# Patient Record
Sex: MALE | Race: WHITE | Employment: FULL TIME | URBAN - METROPOLITAN AREA
[De-identification: names, ages, dates, MRNs, and addresses within clinical notes are randomized per-mention and may not be internally consistent; named-entity substitution may affect disease eponyms.]

---

## 2022-01-15 ENCOUNTER — HOSPITAL ENCOUNTER (INPATIENT)
Age: 57
LOS: 2 days | Discharge: HOME | DRG: 240 | End: 2022-01-17
Attending: EMERGENCY MEDICINE | Admitting: HOSPITALIST
Payer: MEDICAID

## 2022-01-15 ENCOUNTER — APPOINTMENT (OUTPATIENT)
Dept: CT IMAGING | Age: 57
DRG: 240 | End: 2022-01-15
Attending: EMERGENCY MEDICINE
Payer: MEDICAID

## 2022-01-15 ENCOUNTER — APPOINTMENT (OUTPATIENT)
Dept: GENERAL RADIOLOGY | Age: 57
DRG: 240 | End: 2022-01-15
Attending: EMERGENCY MEDICINE
Payer: MEDICAID

## 2022-01-15 DIAGNOSIS — R91.1 PULMONARY NODULE: Primary | ICD-10-CM

## 2022-01-15 DIAGNOSIS — K22.89 ESOPHAGEAL MASS: ICD-10-CM

## 2022-01-15 LAB
ALBUMIN SERPL-MCNC: 2.6 G/DL (ref 3.5–5)
ALBUMIN/GLOB SERPL: 0.6 {RATIO} (ref 1.1–2.2)
ALP SERPL-CCNC: 94 U/L (ref 45–117)
ALT SERPL-CCNC: 12 U/L (ref 12–78)
ANION GAP SERPL CALC-SCNC: 12 MMOL/L (ref 5–15)
APAP SERPL-MCNC: <2 UG/ML (ref 10–30)
APPEARANCE UR: CLEAR
AST SERPL-CCNC: 19 U/L (ref 15–37)
BACTERIA URNS QL MICRO: NEGATIVE /HPF
BASOPHILS # BLD: 0.1 K/UL (ref 0–0.1)
BASOPHILS NFR BLD: 1 % (ref 0–1)
BILIRUB SERPL-MCNC: 0.4 MG/DL (ref 0.2–1)
BILIRUB UR QL: NEGATIVE
BUN SERPL-MCNC: 12 MG/DL (ref 6–20)
BUN/CREAT SERPL: 14 (ref 12–20)
CALCIUM SERPL-MCNC: 8.8 MG/DL (ref 8.5–10.1)
CHLORIDE SERPL-SCNC: 101 MMOL/L (ref 97–108)
CO2 SERPL-SCNC: 23 MMOL/L (ref 21–32)
COLOR UR: NORMAL
COMMENT, HOLDF: NORMAL
COVID-19 RAPID TEST, COVR: NOT DETECTED
CREAT SERPL-MCNC: 0.88 MG/DL (ref 0.7–1.3)
DIFFERENTIAL METHOD BLD: ABNORMAL
EOSINOPHIL # BLD: 0.1 K/UL (ref 0–0.4)
EOSINOPHIL NFR BLD: 1 % (ref 0–7)
EPITH CASTS URNS QL MICRO: NORMAL /LPF
ERYTHROCYTE [DISTWIDTH] IN BLOOD BY AUTOMATED COUNT: 13.5 % (ref 11.5–14.5)
ETHANOL SERPL-MCNC: <10 MG/DL
GLOBULIN SER CALC-MCNC: 4.3 G/DL (ref 2–4)
GLUCOSE SERPL-MCNC: 108 MG/DL (ref 65–100)
GLUCOSE UR STRIP.AUTO-MCNC: NEGATIVE MG/DL
HCT VFR BLD AUTO: 44 % (ref 36.6–50.3)
HGB BLD-MCNC: 14.7 G/DL (ref 12.1–17)
HGB UR QL STRIP: NEGATIVE
IMM GRANULOCYTES # BLD AUTO: 0 K/UL (ref 0–0.04)
IMM GRANULOCYTES NFR BLD AUTO: 0 % (ref 0–0.5)
INR PPP: 1.1 (ref 0.9–1.1)
KETONES UR QL STRIP.AUTO: NEGATIVE MG/DL
LEUKOCYTE ESTERASE UR QL STRIP.AUTO: NEGATIVE
LIPASE SERPL-CCNC: 63 U/L (ref 73–393)
LYMPHOCYTES # BLD: 1 K/UL (ref 0.8–3.5)
LYMPHOCYTES NFR BLD: 10 % (ref 12–49)
MAGNESIUM SERPL-MCNC: 1.8 MG/DL (ref 1.6–2.4)
MCH RBC QN AUTO: 29.2 PG (ref 26–34)
MCHC RBC AUTO-ENTMCNC: 33.4 G/DL (ref 30–36.5)
MCV RBC AUTO: 87.5 FL (ref 80–99)
MONOCYTES # BLD: 0.9 K/UL (ref 0–1)
MONOCYTES NFR BLD: 9 % (ref 5–13)
NEUTS SEG # BLD: 7.9 K/UL (ref 1.8–8)
NEUTS SEG NFR BLD: 79 % (ref 32–75)
NITRITE UR QL STRIP.AUTO: NEGATIVE
NRBC # BLD: 0 K/UL (ref 0–0.01)
NRBC BLD-RTO: 0 PER 100 WBC
PH UR STRIP: 6.5 [PH] (ref 5–8)
PLATELET # BLD AUTO: 235 K/UL (ref 150–400)
PMV BLD AUTO: 10.3 FL (ref 8.9–12.9)
POTASSIUM SERPL-SCNC: 3.9 MMOL/L (ref 3.5–5.1)
PROT SERPL-MCNC: 6.9 G/DL (ref 6.4–8.2)
PROT UR STRIP-MCNC: NEGATIVE MG/DL
PROTHROMBIN TIME: 10.6 SEC (ref 9–11.1)
RBC # BLD AUTO: 5.03 M/UL (ref 4.1–5.7)
RBC #/AREA URNS HPF: NORMAL /HPF (ref 0–5)
SAMPLES BEING HELD,HOLD: NORMAL
SODIUM SERPL-SCNC: 136 MMOL/L (ref 136–145)
SOURCE, COVRS: NORMAL
SP GR UR REFRACTOMETRY: 1.01 (ref 1–1.03)
UROBILINOGEN UR QL STRIP.AUTO: 0.2 EU/DL (ref 0.2–1)
WBC # BLD AUTO: 9.9 K/UL (ref 4.1–11.1)
WBC URNS QL MICRO: NORMAL /HPF (ref 0–4)

## 2022-01-15 PROCEDURE — 83690 ASSAY OF LIPASE: CPT

## 2022-01-15 PROCEDURE — 99284 EMERGENCY DEPT VISIT MOD MDM: CPT

## 2022-01-15 PROCEDURE — 85025 COMPLETE CBC W/AUTO DIFF WBC: CPT

## 2022-01-15 PROCEDURE — 36415 COLL VENOUS BLD VENIPUNCTURE: CPT

## 2022-01-15 PROCEDURE — 74011000636 HC RX REV CODE- 636: Performed by: EMERGENCY MEDICINE

## 2022-01-15 PROCEDURE — 85610 PROTHROMBIN TIME: CPT

## 2022-01-15 PROCEDURE — 74011000250 HC RX REV CODE- 250: Performed by: INTERNAL MEDICINE

## 2022-01-15 PROCEDURE — 74011250636 HC RX REV CODE- 250/636: Performed by: INTERNAL MEDICINE

## 2022-01-15 PROCEDURE — 83735 ASSAY OF MAGNESIUM: CPT

## 2022-01-15 PROCEDURE — 71045 X-RAY EXAM CHEST 1 VIEW: CPT

## 2022-01-15 PROCEDURE — 81001 URINALYSIS AUTO W/SCOPE: CPT

## 2022-01-15 PROCEDURE — 65270000029 HC RM PRIVATE

## 2022-01-15 PROCEDURE — 82077 ASSAY SPEC XCP UR&BREATH IA: CPT

## 2022-01-15 PROCEDURE — 80053 COMPREHEN METABOLIC PANEL: CPT

## 2022-01-15 PROCEDURE — 87635 SARS-COV-2 COVID-19 AMP PRB: CPT

## 2022-01-15 PROCEDURE — 74177 CT ABD & PELVIS W/CONTRAST: CPT

## 2022-01-15 PROCEDURE — 80143 DRUG ASSAY ACETAMINOPHEN: CPT

## 2022-01-15 RX ORDER — ASCORBIC ACID 500 MG
500 TABLET ORAL DAILY
COMMUNITY

## 2022-01-15 RX ORDER — ONDANSETRON 4 MG/1
4 TABLET, ORALLY DISINTEGRATING ORAL
Status: DISCONTINUED | OUTPATIENT
Start: 2022-01-15 | End: 2022-01-17 | Stop reason: HOSPADM

## 2022-01-15 RX ORDER — SODIUM CHLORIDE 0.9 % (FLUSH) 0.9 %
10 SYRINGE (ML) INJECTION DAILY
Status: DISCONTINUED | OUTPATIENT
Start: 2022-01-16 | End: 2022-01-17 | Stop reason: HOSPADM

## 2022-01-15 RX ORDER — PANTOPRAZOLE SODIUM 40 MG/10ML
40 INJECTION, POWDER, LYOPHILIZED, FOR SOLUTION INTRAVENOUS DAILY
Status: DISCONTINUED | OUTPATIENT
Start: 2022-01-16 | End: 2022-01-17 | Stop reason: HOSPADM

## 2022-01-15 RX ORDER — ACETAMINOPHEN 325 MG/1
650 TABLET ORAL
Status: DISCONTINUED | OUTPATIENT
Start: 2022-01-15 | End: 2022-01-17 | Stop reason: HOSPADM

## 2022-01-15 RX ORDER — SODIUM CHLORIDE 0.9 % (FLUSH) 0.9 %
5-40 SYRINGE (ML) INJECTION EVERY 8 HOURS
Status: DISCONTINUED | OUTPATIENT
Start: 2022-01-15 | End: 2022-01-17 | Stop reason: HOSPADM

## 2022-01-15 RX ORDER — ONDANSETRON 2 MG/ML
4 INJECTION INTRAMUSCULAR; INTRAVENOUS
Status: DISCONTINUED | OUTPATIENT
Start: 2022-01-15 | End: 2022-01-17 | Stop reason: HOSPADM

## 2022-01-15 RX ORDER — ACETAMINOPHEN 650 MG/1
650 SUPPOSITORY RECTAL
Status: DISCONTINUED | OUTPATIENT
Start: 2022-01-15 | End: 2022-01-17 | Stop reason: HOSPADM

## 2022-01-15 RX ORDER — POLYETHYLENE GLYCOL 3350 17 G/17G
17 POWDER, FOR SOLUTION ORAL DAILY PRN
Status: DISCONTINUED | OUTPATIENT
Start: 2022-01-15 | End: 2022-01-17 | Stop reason: HOSPADM

## 2022-01-15 RX ORDER — MORPHINE SULFATE 2 MG/ML
2 INJECTION, SOLUTION INTRAMUSCULAR; INTRAVENOUS
Status: DISCONTINUED | OUTPATIENT
Start: 2022-01-15 | End: 2022-01-17 | Stop reason: HOSPADM

## 2022-01-15 RX ORDER — SODIUM CHLORIDE 0.9 % (FLUSH) 0.9 %
5-40 SYRINGE (ML) INJECTION AS NEEDED
Status: DISCONTINUED | OUTPATIENT
Start: 2022-01-15 | End: 2022-01-17 | Stop reason: HOSPADM

## 2022-01-15 RX ORDER — HEPARIN SODIUM 5000 [USP'U]/ML
5000 INJECTION, SOLUTION INTRAVENOUS; SUBCUTANEOUS EVERY 8 HOURS
Status: DISPENSED | OUTPATIENT
Start: 2022-01-15 | End: 2022-01-17

## 2022-01-15 RX ORDER — SODIUM CHLORIDE, SODIUM LACTATE, POTASSIUM CHLORIDE, CALCIUM CHLORIDE 600; 310; 30; 20 MG/100ML; MG/100ML; MG/100ML; MG/100ML
75 INJECTION, SOLUTION INTRAVENOUS CONTINUOUS
Status: DISCONTINUED | OUTPATIENT
Start: 2022-01-15 | End: 2022-01-17 | Stop reason: HOSPADM

## 2022-01-15 RX ADMIN — HEPARIN SODIUM 5000 UNITS: 5000 INJECTION, SOLUTION INTRAVENOUS; SUBCUTANEOUS at 21:37

## 2022-01-15 RX ADMIN — IOPAMIDOL 100 ML: 755 INJECTION, SOLUTION INTRAVENOUS at 13:47

## 2022-01-15 RX ADMIN — SODIUM CHLORIDE, PRESERVATIVE FREE 10 ML: 5 INJECTION INTRAVENOUS at 21:36

## 2022-01-15 RX ADMIN — SODIUM CHLORIDE, POTASSIUM CHLORIDE, SODIUM LACTATE AND CALCIUM CHLORIDE 100 ML/HR: 600; 310; 30; 20 INJECTION, SOLUTION INTRAVENOUS at 21:36

## 2022-01-15 NOTE — ED TRIAGE NOTES
Triage: pt c/o upper abd pain x2 weeks. +loss of weight ~10-15 lbs over the last ~2 months. Denies fall, injury, or N/V/D. +constipation.

## 2022-01-15 NOTE — ED PROVIDER NOTES
Patient was brought here by his sister because of a several week history of unintentional 10 to 15 pound weight loss, fatigue, early satiety, pain in the epigastric right upper quadrant and right flank area and indigestion. He denies any medical problems. No abdominal history. Denies alcohol tobacco illegal drugs. Denies acetaminophen use. Does admit to taking over-the-counter vitamins which is not new for him. He does report increased fatigue. Denies fever or chills. No history of cancer. Denies dark-colored stools. Denies dysuria urgency frequency. Sister at bedside states that his skin looks more pale to yellow. Patient denies noticing any yellow. Denies history of hepatitis or hepatobiliary process. Denies any pain at this time. He states that he would not of come to the ED today were not for sister. But, he does admit that he knows that he needs to be seen by doctor. Denies feeling itchy. History reviewed. No pertinent past medical history. History reviewed. No pertinent surgical history. History reviewed. No pertinent family history.     Social History     Socioeconomic History    Marital status: SINGLE     Spouse name: Not on file    Number of children: Not on file    Years of education: Not on file    Highest education level: Not on file   Occupational History    Not on file   Tobacco Use    Smoking status: Former Smoker    Smokeless tobacco: Never Used   Substance and Sexual Activity    Alcohol use: Not Currently     Comment: Rarely    Drug use: Never    Sexual activity: Not on file   Other Topics Concern    Not on file   Social History Narrative    Not on file     Social Determinants of Health     Financial Resource Strain:     Difficulty of Paying Living Expenses: Not on file   Food Insecurity:     Worried About Running Out of Food in the Last Year: Not on file    Siddharth of Food in the Last Year: Not on file   Transportation Needs:     Lack of Transportation (Medical): Not on file    Lack of Transportation (Non-Medical): Not on file   Physical Activity:     Days of Exercise per Week: Not on file    Minutes of Exercise per Session: Not on file   Stress:     Feeling of Stress : Not on file   Social Connections:     Frequency of Communication with Friends and Family: Not on file    Frequency of Social Gatherings with Friends and Family: Not on file    Attends [employer] Services: Not on file    Active Member of [address] [place] [address] or Organizations: Not on file    Attends Club or Organization Meetings: Not on file    Marital Status: Not on file   Intimate Partner Violence:     Fear of Current or Ex-Partner: Not on file    Emotionally Abused: Not on file    Physically Abused: Not on file    Sexually Abused: Not on file   Housing Stability:     Unable to Pay for Housing in the Last Year: Not on file    Number of [place] in the Last Year: Not on file    Unstable Housing in the Last Year: Not on file         ALLERGIES: Patient has no known allergies. Review of Systems   Constitutional: Positive for fatigue. Negative for chills and fever. HENT: Negative for rhinorrhea and sore throat. Eyes: Negative for discharge and itching. Respiratory: Negative for cough, shortness of breath and wheezing. Cardiovascular: Negative for chest pain, palpitations and leg swelling. Gastrointestinal: Positive for abdominal pain. Negative for blood in stool, constipation, diarrhea, nausea and vomiting. Genitourinary: Positive for flank pain. Negative for dysuria and hematuria. Musculoskeletal: Negative for arthralgias, back pain, gait problem, joint swelling, myalgias, neck pain and neck stiffness. Skin: Positive for color change. Negative for pallor, rash and wound. Neurological: Negative for dizziness, syncope, light-headedness, numbness and headaches.        Vitals:    01/15/22 1330 01/15/22 1403 01/15/22 1433 01/15/22 1503   BP: 125/86 139/87 (!) 144/83 (!) 150/93   Pulse:       Resp:       Temp:       SpO2: 94% 98% 98% 99%   Weight:                Physical Exam  Vitals and nursing note reviewed. Exam conducted with a chaperone present. Constitutional:       General: He is not in acute distress. Appearance: He is well-developed and normal weight. He is not ill-appearing or toxic-appearing. HENT:      Head: Normocephalic and atraumatic. Mouth/Throat:      Mouth: Mucous membranes are moist.      Pharynx: Oropharynx is clear. No pharyngeal swelling or oropharyngeal exudate. Eyes:      General: No scleral icterus. Extraocular Movements: Extraocular movements intact. Pupils: Pupils are equal, round, and reactive to light. Cardiovascular:      Rate and Rhythm: Normal rate and regular rhythm. Heart sounds: Normal heart sounds. Pulmonary:      Effort: Pulmonary effort is normal.      Breath sounds: Normal breath sounds. Abdominal:      General: Abdomen is flat. Bowel sounds are normal. There is no distension. There are no signs of injury. Palpations: Abdomen is rigid. Tenderness: There is no abdominal tenderness. There is no right CVA tenderness, left CVA tenderness, guarding or rebound. Negative signs include Gutierrez's sign, Rovsing's sign and McBurney's sign. Skin:     General: Skin is warm and dry. Capillary Refill: Capillary refill takes less than 2 seconds. Coloration: Skin is not cyanotic, jaundiced, mottled or pale. Findings: No erythema or rash. Neurological:      General: No focal deficit present. Mental Status: He is alert and oriented to person, place, and time. Cranial Nerves: No cranial nerve deficit. Motor: No weakness. Comments: No asterixis   Psychiatric:         Mood and Affect: Mood normal.         Behavior: Behavior normal.          [facility]  ED Course as of 01/15/22 1532   Sat Thomas 15, 2022   1234 Patient examined.   Mild tachycardia otherwise vital signs normal.  Patient in no acute distress. No abdominal tenderness or abdominal physical exam findings at this time. Due to the history concern for possible hepatobiliary process or pancreas involvement. We will get CT scan of the abdomen pelvis. We will get COVID test.  We will get labs and urinalysis. [AF]   1324 COVID-19 RAPID TEST:    Specimen source Nasopharyngeal   COVID-19 rapid test Not detected [AF]   1324 LIPASE(!):    Lipase 63(!) [AF]   1324 MAGNESIUM:    Magnesium 1.8 [AF]   1324 CBC WITH AUTOMATED DIFF(!):    WBC 9.9   RBC 5.03   HGB 14.7   HCT 44.0   MCV 87.5   MCH 29.2   MCHC 33.4   RDW 13.5   PLATELET 827   MPV 50.5   NRBC 0.0   ABSOLUTE NRBC 0.00   NEUTROPHILS 79(!)   LYMPHOCYTES 10(!)   MONOCYTES 9   EOSINOPHILS 1   BASOPHILS 1   IMMATURE GRANULOCYTES 0   ABS. NEUTROPHILS 7.9   ABS. LYMPHOCYTES 1.0   ABS. MONOCYTES 0.9   ABS. EOSINOPHILS 0.1   ABS. BASOPHILS 0.1   ABS. IMM. GRANS. 0.0   DF AUTOMATED [AF]   1324 COMPREHENSIVE METABOLIC PANEL(!):    Sodium 136   Potassium 3.9   Chloride 101   CO2 23   Anion gap 12   Glucose 108(!)   BUN 12   Creatinine 0.88   BUN/Creatinine ratio 14   GFR est AA >60   GFR est non-AA >60   Calcium 8.8   Bilirubin, total 0.4   ALT 12   AST 19   Alk.  phosphatase 94   Protein, total 6.9   Albumin 2.6(!)   Globulin 4.3(!)   A-G Ratio 0.6(!) [AF]   1337 ACETAMINOPHEN(!):    Acetaminophen level <2(!) [AF]   1337 ETHYL ALCOHOL:    ALCOHOL(ETHYL),SERUM <10 [AF]   1400 URINALYSIS W/MICROSCOPIC:    Color YELLOW/STRAW   Appearance CLEAR   Specific gravity 1.010   pH (UA) 6.5   Protein Negative   Glucose Negative   Ketone Negative   Bilirubin Negative   Blood Negative   Urobilinogen 0.2   Nitrites Negative   Leukocyte Esterase Negative   WBC PENDING   RBC PENDING   Epithelial cells PENDING   Bacteria PENDING [AF]   1440 URINALYSIS W/MICROSCOPIC:    Color YELLOW/STRAW   Appearance CLEAR   Specific gravity 1.010   pH (UA) 6.5   Protein Negative   Glucose Negative   Ketone Negative   Bilirubin Negative Blood Negative   Urobilinogen 0.2   Nitrites Negative   Leukocyte Esterase Negative   WBC 0-4   RBC 0-5   Epithelial cells FEW   Bacteria Negative [AF]   1441 CT ABD PELV W CONT [AF]   1441 XR CHEST PORT [AF]   0805 Dr Darci Duncan accepting at [facility] [AF]      ED Course User Index  [AF] DO Natasha Loredo Serve Consult for Admission  2:47 PM    ED Room Number: SER08/08  Patient Name and age:  Ashly Portillo 55 y.o.  male  Working Diagnosis:   1. Pulmonary nodule    2. Esophageal mass        COVID-19 Suspicion:  no  Sepsis present:  no  Reassessment needed: no  Code Status:  Full Code  Readmission: no  Isolation Requirements:  no  Recommended Level of Care:  med/surg  Department:[place] ED - [phone]  Other:  Decreased appetitie, weight loss, ct scan concerning for cancer in esophageal area, cxr with pulm nodule.       Procedures

## 2022-01-16 ENCOUNTER — APPOINTMENT (OUTPATIENT)
Dept: CT IMAGING | Age: 57
DRG: 240 | End: 2022-01-16
Attending: INTERNAL MEDICINE
Payer: MEDICAID

## 2022-01-16 LAB
ALBUMIN SERPL-MCNC: 2.5 G/DL (ref 3.5–5)
ALBUMIN/GLOB SERPL: 0.8 {RATIO} (ref 1.1–2.2)
ALP SERPL-CCNC: 85 U/L (ref 45–117)
ALT SERPL-CCNC: 12 U/L (ref 12–78)
ANION GAP SERPL CALC-SCNC: 5 MMOL/L (ref 5–15)
AST SERPL-CCNC: 14 U/L (ref 15–37)
BASOPHILS # BLD: 0.1 K/UL (ref 0–0.1)
BASOPHILS NFR BLD: 1 % (ref 0–1)
BILIRUB SERPL-MCNC: 0.4 MG/DL (ref 0.2–1)
BUN SERPL-MCNC: 8 MG/DL (ref 6–20)
BUN/CREAT SERPL: 13 (ref 12–20)
CALCIUM SERPL-MCNC: 8.5 MG/DL (ref 8.5–10.1)
CHLORIDE SERPL-SCNC: 107 MMOL/L (ref 97–108)
CO2 SERPL-SCNC: 25 MMOL/L (ref 21–32)
CREAT SERPL-MCNC: 0.62 MG/DL (ref 0.7–1.3)
DIFFERENTIAL METHOD BLD: ABNORMAL
EOSINOPHIL # BLD: 0.2 K/UL (ref 0–0.4)
EOSINOPHIL NFR BLD: 2 % (ref 0–7)
ERYTHROCYTE [DISTWIDTH] IN BLOOD BY AUTOMATED COUNT: 12.9 % (ref 11.5–14.5)
GLOBULIN SER CALC-MCNC: 3.3 G/DL (ref 2–4)
GLUCOSE SERPL-MCNC: 91 MG/DL (ref 65–100)
HCT VFR BLD AUTO: 38.9 % (ref 36.6–50.3)
HGB BLD-MCNC: 12.7 G/DL (ref 12.1–17)
IMM GRANULOCYTES # BLD AUTO: 0 K/UL (ref 0–0.04)
IMM GRANULOCYTES NFR BLD AUTO: 0 % (ref 0–0.5)
LYMPHOCYTES # BLD: 0.9 K/UL (ref 0.8–3.5)
LYMPHOCYTES NFR BLD: 11 % (ref 12–49)
MAGNESIUM SERPL-MCNC: 2 MG/DL (ref 1.6–2.4)
MCH RBC QN AUTO: 28.5 PG (ref 26–34)
MCHC RBC AUTO-ENTMCNC: 32.6 G/DL (ref 30–36.5)
MCV RBC AUTO: 87.4 FL (ref 80–99)
MONOCYTES # BLD: 0.9 K/UL (ref 0–1)
MONOCYTES NFR BLD: 10 % (ref 5–13)
NEUTS SEG # BLD: 6.4 K/UL (ref 1.8–8)
NEUTS SEG NFR BLD: 76 % (ref 32–75)
NRBC # BLD: 0 K/UL (ref 0–0.01)
NRBC BLD-RTO: 0 PER 100 WBC
PHOSPHATE SERPL-MCNC: 4 MG/DL (ref 2.6–4.7)
PLATELET # BLD AUTO: 199 K/UL (ref 150–400)
PMV BLD AUTO: 9.7 FL (ref 8.9–12.9)
POTASSIUM SERPL-SCNC: 3.8 MMOL/L (ref 3.5–5.1)
PROT SERPL-MCNC: 5.8 G/DL (ref 6.4–8.2)
RBC # BLD AUTO: 4.45 M/UL (ref 4.1–5.7)
SODIUM SERPL-SCNC: 137 MMOL/L (ref 136–145)
TSH SERPL DL<=0.05 MIU/L-ACNC: 2.42 UIU/ML (ref 0.36–3.74)
WBC # BLD AUTO: 8.4 K/UL (ref 4.1–11.1)

## 2022-01-16 PROCEDURE — C9113 INJ PANTOPRAZOLE SODIUM, VIA: HCPCS | Performed by: INTERNAL MEDICINE

## 2022-01-16 PROCEDURE — 74011250636 HC RX REV CODE- 250/636: Performed by: INTERNAL MEDICINE

## 2022-01-16 PROCEDURE — 80053 COMPREHEN METABOLIC PANEL: CPT

## 2022-01-16 PROCEDURE — 84100 ASSAY OF PHOSPHORUS: CPT

## 2022-01-16 PROCEDURE — 99222 1ST HOSP IP/OBS MODERATE 55: CPT | Performed by: INTERNAL MEDICINE

## 2022-01-16 PROCEDURE — 84443 ASSAY THYROID STIM HORMONE: CPT

## 2022-01-16 PROCEDURE — 85025 COMPLETE CBC W/AUTO DIFF WBC: CPT

## 2022-01-16 PROCEDURE — 74011250637 HC RX REV CODE- 250/637: Performed by: NURSE PRACTITIONER

## 2022-01-16 PROCEDURE — 83735 ASSAY OF MAGNESIUM: CPT

## 2022-01-16 PROCEDURE — 74011000636 HC RX REV CODE- 636: Performed by: RADIOLOGY

## 2022-01-16 PROCEDURE — 36415 COLL VENOUS BLD VENIPUNCTURE: CPT

## 2022-01-16 PROCEDURE — 93005 ELECTROCARDIOGRAM TRACING: CPT

## 2022-01-16 PROCEDURE — 71260 CT THORAX DX C+: CPT

## 2022-01-16 PROCEDURE — 74011000250 HC RX REV CODE- 250: Performed by: INTERNAL MEDICINE

## 2022-01-16 PROCEDURE — 94760 N-INVAS EAR/PLS OXIMETRY 1: CPT

## 2022-01-16 PROCEDURE — 65270000029 HC RM PRIVATE

## 2022-01-16 RX ORDER — CALCIUM CARBONATE 200(500)MG
200 TABLET,CHEWABLE ORAL
Status: DISCONTINUED | OUTPATIENT
Start: 2022-01-16 | End: 2022-01-17 | Stop reason: HOSPADM

## 2022-01-16 RX ADMIN — HEPARIN SODIUM 5000 UNITS: 5000 INJECTION, SOLUTION INTRAVENOUS; SUBCUTANEOUS at 05:40

## 2022-01-16 RX ADMIN — HEPARIN SODIUM 5000 UNITS: 5000 INJECTION, SOLUTION INTRAVENOUS; SUBCUTANEOUS at 20:56

## 2022-01-16 RX ADMIN — PANTOPRAZOLE SODIUM 40 MG: 40 INJECTION, POWDER, FOR SOLUTION INTRAVENOUS at 07:52

## 2022-01-16 RX ADMIN — MORPHINE SULFATE 2 MG: 2 INJECTION, SOLUTION INTRAMUSCULAR; INTRAVENOUS at 00:09

## 2022-01-16 RX ADMIN — CALCIUM CARBONATE (ANTACID) CHEW TAB 500 MG 200 MG: 500 CHEW TAB at 21:53

## 2022-01-16 RX ADMIN — IOPAMIDOL 100 ML: 612 INJECTION, SOLUTION INTRAVENOUS at 17:20

## 2022-01-16 RX ADMIN — SODIUM CHLORIDE, PRESERVATIVE FREE 10 ML: 5 INJECTION INTRAVENOUS at 20:55

## 2022-01-16 RX ADMIN — SODIUM CHLORIDE, PRESERVATIVE FREE 10 ML: 5 INJECTION INTRAVENOUS at 07:52

## 2022-01-16 NOTE — PROGRESS NOTES
Transition of Care-  Anticipate discharge home without services pending medical progress and discharge needs. RUR 5%    Patients sisterWenceslao Fore or mother will be able to provide transportation home when discharged. Cm contacted patient, introduced self, explained role and offered support. Patient lives alone in [address] [place] [address] and was independent w/ adls and iadls. Patient was in [place] visiting his sister. He has not been feeling well for a few weeks. Patient plans to stay with his sister or a mother for a week post discharge. Patient is self pay BUT he started a new job and his new insurance starts in March. He doesn't recall the insurance plan. Reason for Admission:  Weight Loss , epigastric right upper quadrant pain.                      RUR Score:  5%               Plan for utilizing home health: Not at this time         PCP: First and Last name:  None  Does not have a PCP but plans on getting one when he has his insurance information in March                    Current Advanced Directive/Advance Care Plan: Full Code      Healthcare Decision Maker:   Click here to complete [employer] including selection of the Healthcare Decision Maker Relationship (ie \"Primary\")

## 2022-01-16 NOTE — H&P
[facility]  HISTORY AND PHYSICAL    Name:  Antonino Bland  MR#:  [id number]  :  1965  ACCOUNT #:  [de-identified]  ADMIT DATE:  01/15/2022      The patient was seen, evaluated, and admitted by me on 01/15/2022. PRIMARY CARE PHYSICIAN:  None. SOURCE OF INFORMATION:  The patient and review of ED electronic medical records. CHIEF COMPLAINT: Weight loss. HISTORY OF PRESENT ILLNESS:  This is a 18-year-old man with no significant past medical history, was in his usual state of health until several weeks ago when the patient started losing weight. The patient has lost about 10-15 pounds, which was not intentional during this period of several weeks. The patient also complained of abdominal pain. The pain is located at the epigastric region with radiation to the right flank associated with nausea, but no vomiting. The patient described the pain as dull ache, 2/10 in severity, constant with no known aggravating or relieving factors. No associated fever, rigors or chills. The patient was finally convinced by his sister to go to the emergency room for evaluation. He was taken to [facility] Emergency Room at [facility] where a CT scan of the abdomen and pelvis with contrast revealed possible esophageal mass concerning for malignancy. The patient was then referred to the hospitalist service for evaluation for admission. PAST MEDICAL HISTORY:  Not significant. ALLERGIES:  NO KNOWN DRUG ALLERGIES. MEDICATIONS:  Vitamin C 500 mg daily, turmeric 2 capsules daily. FAMILY HISTORY: This was reviewed. His father  of head and neck cancer. PAST SURGICAL HISTORY:  This is significant for left inguinal hernia repair. SOCIAL HISTORY: The patient is a former smoker. No history of alcohol abuse. REVIEW OF SYSTEMS:  HEAD, EYES, EARS, NOSE AND THROAT:  No headache, no dizziness, no blurring of vision, no photophobia.   RESPIRATORY SYSTEM:  No cough, no shortness of breath, no hemoptysis. CARDIOVASCULAR SYSTEM:  No chest pain, no orthopnea, no palpitation. GASTROINTESTINAL SYSTEM:  This is positive for abdominal pain, nausea. No vomiting. No diarrhea. No constipation. GENITOURINARY SYSTEM:  No dysuria, no urgency and no frequency. All other systems are reviewed and they are negative. PHYSICAL EXAMINATION:  GENERAL APPEARANCE:  The patient appeared ill, in moderate distress. VITAL SIGNS:  On arrival at the emergency room, temperature 98.2, pulse 105, respiratory rate 18, blood pressure 131/89, oxygen saturation 98%. HEENT:  Head:  Normocephalic, atraumatic. Eyes:  Normal eye movement. No redness, no drainage, no discharge. Ears:  Normal external ears with no evidence of drainage. Nose:  No deformity and no drainage. Mouth and Throat:  No visible oral lesion. NECK:  Neck is supple. No JVD, no thyromegaly. CHEST:  Clear breath sounds. No wheezing, no crackles. HEART:  Normal S1 and S2, regular. No clinically appreciable murmur. ABDOMEN:  Soft, nontender. Normal bowel sounds. CNS:  Alert and oriented x3. No gross focal neurological deficit. EXTREMITIES:  No edema. Pulses 2+ bilaterally. MUSCULOSKELETAL SYSTEM:  No evidence of joint deformity and swelling. SKIN:  No active skin lesions seen in the exposed part of the body. PSYCHIATRY:  Normal mood and affect. LYMPHATIC SYSTEM:  No cervical lymphadenopathy. Charmaine Pozo DIAGNOSTIC DATA:  Chest x-ray shows possible right perihilar pulmonary nodule. CT scan of the abdomen and pelvis with contrast shows heterogeneously enhancing concentric lower esophageal wall thickening most concerning for primary neoplastic process, diffuse mediastinal upper abdominal and retroperitoneal adenopathy concerning for metastatic disease, lymphoma is an additional consideration. LABORATORY DATA:  Hematology: WBC 9.9, hemoglobin 14.7, hematocrit 44.0, platelets 189. Coagulation Profile:  INR 1.1, PT 10.6. Magnesium 1.8. Lipase 63. Chemistry:  Sodium 136, potassium 3.9, chloride 101, CO2 of 23, glucose 108, BUN 12, creatinine 0.88, calcium 8.8, total bilirubin 0.4, ALT 12, AST 19, alkaline phosphatase 94, total protein at 6.9, albumin level 2.6, globulin 4.3. COVID-19 rapid test not detected. Acetaminophen level less than 2. Serum alcohol level less than 10. Urinalysis: This is significant for negative nitrite, negative leukocyte esterase, negative bacteria. ASSESSMENT:  1. Esophageal mass. 2.  Pulmonary nodule. PLAN:  1. Esophageal mass. We will admit the patient for further evaluation and treatment. This is concerning for neoplastic disease. Gastroenterology consult will be requested for evaluation for EGD. The patient will be n.p.o. in anticipation of intervention by the gastroenterologist.  Because this mass is also concerning for malignancy, we will consult oncologist for early involvement in the care of this patient. .  We will carry out other supportive treatment including fluid therapy and pain control. 2.  Pulmonary nodule. We will obtain CT scan of the chest for further evaluation of the pulmonary nodule. Pulmonary consult will be requested to assist in further evaluation and treatment. OTHER ISSUES. Code Status: The patient is a full code. We will place the patient on heparin for DVT prophylaxis. FUNCTIONAL STATUS PRIOR TO ADMISSION:  The patient came from home. The patient is ambulatory with no assistive device. COVID PRECAUTION:  The patient was wearing a facemask. I was wearing a facemask and gloves for this patient's encounter.         Baldemar Beckett MD      [id number]  D:  01/15/2022 22:22  T:  01/16/2022 0:22  JOB #:  [id number]

## 2022-01-16 NOTE — CONSULTS
Pulmonary    Reason: Lung nodule  Requesting:  Dr Thelma Sherwood reviewed/images viewed    52-year-old male presented with unintentional weight loss associated with nausea and vomiting. Abdominal CT scan revealed lower esophageal thickening and adenopathy concerning for malignancy. Chest x-ray also suggested a right lung nodule. Patient has been admitted diagnostic work-up    GI has been consulted along with oncology and a designated chest CT scan has been ordered    Pulmonary Impression / Recommendations:    Abnormal abdominal imaging suggestive of esophageal cancer versus lymphoma with lower esophageal thickening lymphadenopathy and possible right lung nodule. Suspect patient will have an endoscopy and tissue diagnosis will be made that way.     -- GI evaluation for biopsy  --Designated chest CT ordered to evaluate possible right lung nodule on chest x-ray  --Oncology to see  --Please consult if needed for bronchoscopic procedure for tissue diagnosis    Lourdes Carrillo MD

## 2022-01-16 NOTE — PROGRESS NOTES
[facility]  Hospitalist Group                                                                                          Hospitalist Progress Note  Keshia Brunner MD  Answering service: [phone] from in house phone        Date of Service:  2022  NAME:  Allison Holland  :  1965  MRN:  [id number]    Admission Summary: This is a 58-year-old man with no significant past medical history, was in his usual state of health until several weeks ago when the patient started losing weight. The patient has lost about 10-15 pounds, which was not intentional during this period of several weeks. The patient also complained of abdominal pain. The pain is located at the epigastric region with radiation to the right flank associated with nausea, but no vomiting. The patient described the pain as dull ache, 2/10 in severity, constant with no known aggravating or relieving factors. No associated fever, rigors or chills. The patient was finally convinced by his sister to go to the emergency room for evaluation. He was taken to [facility] Emergency Room at [facility] where a CT scan of the abdomen and pelvis with contrast revealed possible esophageal mass concerning for malignancy. The patient was then referred to the hospitalist service for evaluation for admission.     Interval history / Subjective:   No acute overnight events  No current pain  Awaiting plan from consultants  NAD     Assessment & Plan:     Esophageal mass  Suggestive of esophageal cancer versus lymphoma with lower esophageal thickening  Mediastinal, abdominal, and retroperitoneal LN concerning for metastatic disease  Await GI evaluation for biopsy  NPO after MN for likely procedures on Monday  Appreciate heme/onc    Pulmonary nodule  Appreciate pulm, will return if needed for bronch for tissue diagnosis  Designated CT chest for further evaluation of the pulmonary nodule    Code status: Full Code  Prophylaxis: Hep SQ but will stop tonight in case of procedure  Care Plan discussed with: Patient/Family and Nurse  Anticipated Disposition: Home w/Family  Anticipated Discharge: Greater than 48 hours     Hospital Problems  Date Reviewed: 1/15/2022          Codes Class Noted POA    * (Principal) Esophageal mass ICD-10-CM: K22.89  ICD-9-CM: 530.89  1/15/2022 Yes                Review of Systems:   Pertinent items are noted in HPI. No fever/chills, poor appetite, cough, sputum, SOB, N/V, D/C, CP, or abd pain. Tolerating PO    Vital Signs:    Last 24hrs VS reviewed since prior progress note. Most recent are:  Visit Vitals  /67 (BP 1 Location: Left arm, BP Patient Position: At rest)   Pulse 89   Temp 98.7 °F (37.1 °C)   Resp 17   Wt 75.6 kg (166 lb 10.7 oz)   SpO2 97%       No intake or output data in the 24 hours ending 01/16/22 1523   Physical Examination:     I had a face to face encounter with this patient and independently examined them on 1/16/2022 as outlined below:    General: Alert, cooperative, no acute distress    EENT:  EOMI. Anicteric sclerae. MMM  Resp:  CTA bilaterally, no wheezing or rales. No accessory muscle use  CV:  RRR, No audible murmur  GI:  Soft, Non distended, Non tender  Neurologic:  Alert and oriented, normal speech, FINN  Extremities: No edema or cyanosis  Psych:   Not anxious or agitated  Skin:  No rashes.  No jaundice    Data Review:    I personally reviewed labs and imaging results    Labs:     Recent Labs     01/16/22  0007 01/15/22  1228   WBC 8.4 9.9   HGB 12.7 14.7   HCT 38.9 44.0    235     Recent Labs     01/16/22  0007 01/15/22  1228    136   K 3.8 3.9    101   CO2 25 23   BUN 8 12   CREA 0.62* 0.88   GLU 91 108*   CA 8.5 8.8   MG 2.0 1.8   PHOS 4.0  --      Recent Labs     01/16/22  0007 01/15/22  1228   ALT 12 12   AP 85 94   TBILI 0.4 0.4   TP 5.8* 6.9   ALB 2.5* 2.6*   GLOB 3.3 4.3*   LPSE  --  63*     Recent Labs     01/15/22  1228   INR 1.1 PTP 10.6      No results for input(s): FE, TIBC, PSAT, FERR in the last 72 hours. No results found for: FOL, RBCF   No results for input(s): PH, PCO2, PO2 in the last 72 hours. No results for input(s): CPK, CKNDX, TROIQ in the last 72 hours.     No lab exists for component: CPKMB  No results found for: CHOL, CHOLX, CHLST, CHOLV, HDL, HDLP, LDL, LDLC, DLDLP, TGLX, TRIGL, TRIGP, CHHD, CHHDX  No results found for: [facility]  Lab Results   Component Value Date/Time    Color YELLOW/STRAW 01/15/2022 01:34 PM    Appearance CLEAR 01/15/2022 01:34 PM    Specific gravity 1.010 01/15/2022 01:34 PM    pH (UA) 6.5 01/15/2022 01:34 PM    Protein Negative 01/15/2022 01:34 PM    Glucose Negative 01/15/2022 01:34 PM    Ketone Negative 01/15/2022 01:34 PM    Bilirubin Negative 01/15/2022 01:34 PM    Urobilinogen 0.2 01/15/2022 01:34 PM    Nitrites Negative 01/15/2022 01:34 PM    Leukocyte Esterase Negative 01/15/2022 01:34 PM    Epithelial cells FEW 01/15/2022 01:34 PM    Bacteria Negative 01/15/2022 01:34 PM    WBC 0-4 01/15/2022 01:34 PM    RBC 0-5 01/15/2022 01:34 PM         Medications Reviewed:     Current Facility-Administered Medications   Medication Dose Route Frequency    iopamidoL (ISOVUE 300) 61 % contrast injection 100 mL  100 mL IntraVENous RAD ONCE    sodium chloride (NS) flush 5-40 mL  5-40 mL IntraVENous Q8H    sodium chloride (NS) flush 5-40 mL  5-40 mL IntraVENous PRN    acetaminophen (TYLENOL) tablet 650 mg  650 mg Oral Q6H PRN    Or    acetaminophen (TYLENOL) suppository 650 mg  650 mg Rectal Q6H PRN    polyethylene glycol (MIRALAX) packet 17 g  17 g Oral DAILY PRN    ondansetron (ZOFRAN ODT) tablet 4 mg  4 mg Oral Q8H PRN    Or    ondansetron (ZOFRAN) injection 4 mg  4 mg IntraVENous Q6H PRN    heparin (porcine) injection 5,000 Units  5,000 Units SubCUTAneous Q8H    morphine injection 2 mg  2 mg IntraVENous Q4H PRN    lactated Ringers infusion  100 mL/hr IntraVENous CONTINUOUS    pantoprazole (PROTONIX) injection 40 mg  40 mg IntraVENous DAILY    And    sodium chloride (NS) flush 10 mL  10 mL IntraVENous DAILY     ______________________________________________________________________  EXPECTED LENGTH OF STAY: - - -  ACTUAL LENGTH OF STAY:          1                 Spenser Castellon MD

## 2022-01-16 NOTE — PROGRESS NOTES
Patient examined, consult dictated. He presents with longstanding GERD, dysphagia and weight loss. CT suggestive of esophageal malignancy. We will proceed with EGD/biopsies possibly tomorrow by Dr Shona Vallecillo. NPO after MN.

## 2022-01-16 NOTE — CONSULTS
[facility] [place] [facility] [place] [address] [facility] [facility]: [phone]  F: [phone]      Reason for Visit:   Ashly Portillo is a 55 y.o. male who is seen in consultation at the request of Dr. Lyubov Sebastian for evaluation of esophageal mass    Treatment History:   · n/a    History of Present Illness:   Pt admitted on 1/15/22. C/o 10-15 lb weight loss, not intentional, over several weeks. Complains of abd pain, epigastric, radiated to right flank, associated with nausea. 2/10, dull. CT showed possible esophageal mass    History reviewed. No pertinent past medical history. History reviewed. No pertinent surgical history. Social History     Tobacco Use    Smoking status: Former Smoker    Smokeless tobacco: Never Used   Substance Use Topics    Alcohol use: Not Currently     Comment: Rarely      History reviewed. No pertinent family history.   Current Facility-Administered Medications   Medication Dose Route Frequency    iopamidoL (ISOVUE 300) 61 % contrast injection 100 mL  100 mL IntraVENous RAD ONCE    sodium chloride (NS) flush 5-40 mL  5-40 mL IntraVENous Q8H    sodium chloride (NS) flush 5-40 mL  5-40 mL IntraVENous PRN    acetaminophen (TYLENOL) tablet 650 mg  650 mg Oral Q6H PRN    Or    acetaminophen (TYLENOL) suppository 650 mg  650 mg Rectal Q6H PRN    polyethylene glycol (MIRALAX) packet 17 g  17 g Oral DAILY PRN    ondansetron (ZOFRAN ODT) tablet 4 mg  4 mg Oral Q8H PRN    Or    ondansetron (ZOFRAN) injection 4 mg  4 mg IntraVENous Q6H PRN    heparin (porcine) injection 5,000 Units  5,000 Units SubCUTAneous Q8H    morphine injection 2 mg  2 mg IntraVENous Q4H PRN    lactated Ringers infusion  100 mL/hr IntraVENous CONTINUOUS    pantoprazole (PROTONIX) injection 40 mg  40 mg IntraVENous DAILY    And    sodium chloride (NS) flush 10 mL  10 mL IntraVENous DAILY      No Known Allergies     Review of Systems: A complete review of systems was obtained, negative except as described above. Physical Exam:     Visit Vitals  /67 (BP 1 Location: Left arm, BP Patient Position: At rest)   Pulse 89   Temp 98.7 °F (37.1 °C)   Resp 17   Wt 166 lb 10.7 oz (75.6 kg)   SpO2 97%     ECOG PS: 1    Constitutional: Appears well-developed and well-nourished in no apparent distress      Mental status: Alert and awake, Oriented to person/place/time, Able to follow commands    Eyes: EOM normal, Sclera normal, No visible ocular discharge    HENT: Normocephalic, atraumatic    Mouth/Throat: Moist mucous membranes    External Ears normal    Neck: No visualized mass    Pulmonary/Chest: Respiratory effort normal, No visualized signs of difficulty breathing or respiratory distress    Musculoskeletal: Normal range of motion of neck    Neurological: No facial asymmetry (Cranial nerve 7 motor function), No gaze palsy    Skin: No significant exanthematous lesions or discoloration noted on facial skin    Psychiatric: Normal affect, No hallucinations             Results:     Lab Results   Component Value Date/Time    WBC 8.4 01/16/2022 12:07 AM    HGB 12.7 01/16/2022 12:07 AM    HCT 38.9 01/16/2022 12:07 AM    PLATELET 787 36/58/0993 12:07 AM    MCV 87.4 01/16/2022 12:07 AM    ABS. NEUTROPHILS 6.4 01/16/2022 12:07 AM     Lab Results   Component Value Date/Time    Sodium 137 01/16/2022 12:07 AM    Potassium 3.8 01/16/2022 12:07 AM    Chloride 107 01/16/2022 12:07 AM    CO2 25 01/16/2022 12:07 AM    Glucose 91 01/16/2022 12:07 AM    BUN 8 01/16/2022 12:07 AM    Creatinine 0.62 (L) 01/16/2022 12:07 AM    GFR est AA >60 01/16/2022 12:07 AM    GFR est non-AA >60 01/16/2022 12:07 AM    Calcium 8.5 01/16/2022 12:07 AM     Lab Results   Component Value Date/Time    Bilirubin, total 0.4 01/16/2022 12:07 AM    ALT (SGPT) 12 01/16/2022 12:07 AM    Alk.  phosphatase 85 01/16/2022 12:07 AM    Protein, total 5.8 (L) 01/16/2022 12:07 AM    Albumin 2.5 (L) 01/16/2022 12:07 AM    Globulin 3.3 01/16/2022 12:07 AM     1/15/22 CT a/p    FINDINGS:   LOWER THORAX: There is a trace left pleural effusion. Diffuse lower esophageal  heterogeneously enhancing wall thickening with lower mediastinal adjacent  necrotic lymphadenopathy. Representative examples include a 2.0 cm and 1.4 cm  low posterior mediastinal nodes (3:7). LIVER: Mild periportal edema. No masses. BILIARY TREE: Gallbladder is within normal limits. CBD is not dilated. SPLEEN: Unremarkable. PANCREAS: No mass or ductal dilatation. ADRENALS: Unremarkable. KIDNEYS: No mass, calculus, or hydronephrosis. Enhance symmetrically. Subcentimeter cystic foci are too small to characterize, though likely  representing benign cysts. STOMACH: Unremarkable. SMALL BOWEL: No dilatation or wall thickening. COLON: Severe diverticulosis. No dilatation or wall thickening. APPENDIX: Unremarkable. PERITONEUM: Diffuse mild mesenteric edema. Mild pelvic simple ascites. Diffuse  bulky retroperitoneal, peripancreatic, perigastric, and matthew hepatis  lymphadenopathy. Representative examples include a 2.5 cm x 3.8 cm matthew hepatis  node (3:26), 2.9 x 3.0 cm portacaval node (3:26), 2.8 x 2.2 cm peripancreatic  node (3:32), and 2.5 x 2.4 cm right periaortic node (3:33). RETROPERITONEUM: No aortic aneurysm. Nodes as above. REPRODUCTIVE ORGANS: The prostate and seminal vesicles are unremarkable. URINARY BLADDER: No mass or calculus. BONES: No destructive bone lesion. ABDOMINAL WALL: No mass or hernia. ADDITIONAL COMMENTS: N/A     IMPRESSION  1. Heterogeneously enhancing concentric lower esophageal wall thickening most  concerning for primary neoplastic process. Further evaluation with endoscopy is  recommended. 2.  Diffuse mediastinal, upper abdominal, and retroperitoneal adenopathy  concerning for metastatic disease. Lymphoma is an additional consideration.       Records reviewed and summarized above. Radiology report(s) reviewed above.       Assessment/plan: 1. Esophageal mass:  GI consulted; mediastinal, abdominal, and retroperitoneal LN concerning for metastatic disease; will have NPO after MN for likely procedures on Monday. Will defer to Monday team to determine what LN is able to be biopsied        I appreciate the opportunity to participate in Mr. [place] care. Signed By: Oliver Fowler MD      No orders of the defined types were placed in this encounter.

## 2022-01-17 ENCOUNTER — ANESTHESIA (OUTPATIENT)
Dept: ENDOSCOPY | Age: 57
DRG: 240 | End: 2022-01-17
Payer: MEDICAID

## 2022-01-17 ENCOUNTER — APPOINTMENT (OUTPATIENT)
Dept: GENERAL RADIOLOGY | Age: 57
DRG: 240 | End: 2022-01-17
Attending: INTERNAL MEDICINE
Payer: MEDICAID

## 2022-01-17 ENCOUNTER — ANESTHESIA EVENT (OUTPATIENT)
Dept: ENDOSCOPY | Age: 57
DRG: 240 | End: 2022-01-17
Payer: MEDICAID

## 2022-01-17 VITALS
DIASTOLIC BLOOD PRESSURE: 74 MMHG | OXYGEN SATURATION: 98 % | WEIGHT: 166.67 LBS | TEMPERATURE: 97.4 F | HEIGHT: 71 IN | HEART RATE: 88 BPM | SYSTOLIC BLOOD PRESSURE: 113 MMHG | BODY MASS INDEX: 23.33 KG/M2 | RESPIRATION RATE: 16 BRPM

## 2022-01-17 LAB
ATRIAL RATE: 78 BPM
CALCULATED P AXIS, ECG09: 52 DEGREES
CALCULATED R AXIS, ECG10: 36 DEGREES
CALCULATED T AXIS, ECG11: 5 DEGREES
DIAGNOSIS, 93000: NORMAL
P-R INTERVAL, ECG05: 146 MS
Q-T INTERVAL, ECG07: 388 MS
QRS DURATION, ECG06: 86 MS
QTC CALCULATION (BEZET), ECG08: 442 MS
VENTRICULAR RATE, ECG03: 78 BPM

## 2022-01-17 PROCEDURE — 0DB58ZX EXCISION OF ESOPHAGUS, VIA NATURAL OR ARTIFICIAL OPENING ENDOSCOPIC, DIAGNOSTIC: ICD-10-PCS | Performed by: INTERNAL MEDICINE

## 2022-01-17 PROCEDURE — 74011250636 HC RX REV CODE- 250/636: Performed by: NURSE ANESTHETIST, CERTIFIED REGISTERED

## 2022-01-17 PROCEDURE — 74011250636 HC RX REV CODE- 250/636: Performed by: INTERNAL MEDICINE

## 2022-01-17 PROCEDURE — C9113 INJ PANTOPRAZOLE SODIUM, VIA: HCPCS | Performed by: INTERNAL MEDICINE

## 2022-01-17 PROCEDURE — 74220 X-RAY XM ESOPHAGUS 1CNTRST: CPT

## 2022-01-17 PROCEDURE — 76060000031 HC ANESTHESIA FIRST 0.5 HR: Performed by: INTERNAL MEDICINE

## 2022-01-17 PROCEDURE — 74011000250 HC RX REV CODE- 250: Performed by: INTERNAL MEDICINE

## 2022-01-17 PROCEDURE — 88360 TUMOR IMMUNOHISTOCHEM/MANUAL: CPT

## 2022-01-17 PROCEDURE — 77030021593 HC FCPS BIOP ENDOSC BSC -A: Performed by: INTERNAL MEDICINE

## 2022-01-17 PROCEDURE — 99231 SBSQ HOSP IP/OBS SF/LOW 25: CPT | Performed by: PHYSICIAN ASSISTANT

## 2022-01-17 PROCEDURE — 88341 IMHCHEM/IMCYTCHM EA ADD ANTB: CPT

## 2022-01-17 PROCEDURE — 74011000636 HC RX REV CODE- 636: Performed by: RADIOLOGY

## 2022-01-17 PROCEDURE — 2709999900 HC NON-CHARGEABLE SUPPLY: Performed by: INTERNAL MEDICINE

## 2022-01-17 PROCEDURE — 76040000019: Performed by: INTERNAL MEDICINE

## 2022-01-17 PROCEDURE — 99233 SBSQ HOSP IP/OBS HIGH 50: CPT | Performed by: INTERNAL MEDICINE

## 2022-01-17 PROCEDURE — 88342 IMHCHEM/IMCYTCHM 1ST ANTB: CPT

## 2022-01-17 PROCEDURE — 74011250637 HC RX REV CODE- 250/637: Performed by: INTERNAL MEDICINE

## 2022-01-17 PROCEDURE — 74011000250 HC RX REV CODE- 250: Performed by: NURSE ANESTHETIST, CERTIFIED REGISTERED

## 2022-01-17 PROCEDURE — 88305 TISSUE EXAM BY PATHOLOGIST: CPT

## 2022-01-17 RX ORDER — LIDOCAINE HYDROCHLORIDE 20 MG/ML
INJECTION, SOLUTION EPIDURAL; INFILTRATION; INTRACAUDAL; PERINEURAL AS NEEDED
Status: DISCONTINUED | OUTPATIENT
Start: 2022-01-17 | End: 2022-01-17 | Stop reason: HOSPADM

## 2022-01-17 RX ORDER — PROPOFOL 10 MG/ML
INJECTION, EMULSION INTRAVENOUS AS NEEDED
Status: DISCONTINUED | OUTPATIENT
Start: 2022-01-17 | End: 2022-01-17 | Stop reason: HOSPADM

## 2022-01-17 RX ORDER — SODIUM CHLORIDE 9 MG/ML
INJECTION, SOLUTION INTRAVENOUS
Status: DISCONTINUED | OUTPATIENT
Start: 2022-01-17 | End: 2022-01-17 | Stop reason: HOSPADM

## 2022-01-17 RX ADMIN — PROPOFOL 100 MG: 10 INJECTION, EMULSION INTRAVENOUS at 12:03

## 2022-01-17 RX ADMIN — PANTOPRAZOLE SODIUM 40 MG: 40 INJECTION, POWDER, FOR SOLUTION INTRAVENOUS at 09:23

## 2022-01-17 RX ADMIN — PROPOFOL 100 MG: 10 INJECTION, EMULSION INTRAVENOUS at 12:02

## 2022-01-17 RX ADMIN — SODIUM CHLORIDE: 900 INJECTION, SOLUTION INTRAVENOUS at 11:57

## 2022-01-17 RX ADMIN — SODIUM CHLORIDE, POTASSIUM CHLORIDE, SODIUM LACTATE AND CALCIUM CHLORIDE 75 ML/HR: 600; 310; 30; 20 INJECTION, SOLUTION INTRAVENOUS at 04:54

## 2022-01-17 RX ADMIN — SODIUM CHLORIDE, PRESERVATIVE FREE 10 ML: 5 INJECTION INTRAVENOUS at 09:23

## 2022-01-17 RX ADMIN — PROPOFOL 50 MG: 10 INJECTION, EMULSION INTRAVENOUS at 12:06

## 2022-01-17 RX ADMIN — IOHEXOL 100 ML: 350 INJECTION, SOLUTION INTRAVENOUS at 13:46

## 2022-01-17 RX ADMIN — PROPOFOL 100 MG: 10 INJECTION, EMULSION INTRAVENOUS at 12:04

## 2022-01-17 RX ADMIN — LIDOCAINE HYDROCHLORIDE 100 MG: 20 INJECTION, SOLUTION EPIDURAL; INFILTRATION; INTRACAUDAL; PERINEURAL at 12:01

## 2022-01-17 RX ADMIN — SODIUM CHLORIDE, PRESERVATIVE FREE 10 ML: 5 INJECTION INTRAVENOUS at 15:53

## 2022-01-17 RX ADMIN — PROPOFOL 50 MG: 10 INJECTION, EMULSION INTRAVENOUS at 12:08

## 2022-01-17 RX ADMIN — SODIUM CHLORIDE 80 MCG: 9 INJECTION, SOLUTION INTRAVENOUS at 12:06

## 2022-01-17 RX ADMIN — ONDANSETRON 4 MG: 4 TABLET, ORALLY DISINTEGRATING ORAL at 02:14

## 2022-01-17 NOTE — PROCEDURES
[address]  [address], [address]  [phone]                            NAME:  Xiao Vences   :   1965   MRN:   [id number]     Date/Time:  2022 12:16 PM    Esophagogastroduodenoscopy (EGD) Procedure Note    :  Donna Zimmer MD    Staff: Endoscopy Technician-1: Colette Braga  Endoscopy RN-1: Juan Luciano RN    Referring Provider:  None    Anethesia/Sedation:  MAC    Procedure Details   After infomed consent was obtained for the procedure, with all risks and benefits of procedure explained the patient was taken to the endoscopy suite and placed in the left lateral decubitus position. Following sequential administration of sedation as per above, the gastroscope was inserted into the mouth and advanced under direct vision to second portion of the duodenum. A careful inspection was made as the gastroscope was withdrawn, including a retroflexed view of the proximal stomach; findings and interventions are described below. Findings:  Esophagus: small 1.5 cm nodule of proximal esophagus, biopsied to evaluate for neoplasia. From 30cm from incisors to GEJ (40cm from incisors), there is extensive, circumferential friable, granular, and nodular mucosa with a large lateral ulceration possibly fistulizing into third space or pleura. Area biopsied extensively to evaluate for neoplasia and malignancy. Stomach:normal  Duodenum:normal    Interventions:  biopsy of esophagus           Specimens Removed:    ID Type Source Tests Collected by Time Destination   1 : Distal Esophagus Mass Bx Preservative Esophagus, Distal  Vladimir Sparrow MD 2022 1208 Pathology   2 : Proximal Esophagus Mass Bx Preservative Esophagus, Proximal  Vladimir Sparrow MD 2022 1209 Pathology       Complications: None.      EBL:  none    Impression:    See Postoperative diagnosis above    Recommendations:   - Await pathology evaluation of biopsy / resected tissue  - Gastrograffin esophagram to evaluate for tumor fistula  - Clear liquids only  - Consult heme onc and surg onc for further management  - Will sign off; please consult our service if surgery requesting esophageal stent    Discharge disposition:  Margie Aguilar MD

## 2022-01-17 NOTE — DISCHARGE SUMMARY
Discharge Summary     PATIENT ID: Nestor Cuevas  MRN: [id number]   YOB: 1965    DATE OF ADMISSION: 1/15/2022 12:23 PM    DATE OF DISCHARGE: 1/17/2022  PRIMARY CARE PROVIDER: None   DISCHARGING PROVIDER: Meme Sandoval MD    To contact this individual call [phone] and ask the  to page. If unavailable ask to be transferred the Adult Hospitalist Department. CONSULTATIONS: IP CONSULT TO GASTROENTEROLOGY  IP CONSULT TO HEMATOLOGY  IP CONSULT TO PULMONOLOGY  IP CONSULT TO GENERAL SURGERY    PROCEDURES/SURGERIES: Procedure(s):  ESOPHAGOGASTRODUODENOSCOPY (EGD)  ESOPHAGOGASTRODUODENAL (EGD) BIOPSY    ADMITTING [address] [place] [address] COURSE:   This is a 43-year-old man with no significant past medical history, was in his usual state of health until several weeks ago when the patient started losing weight.  The patient has lost about 10-15 pounds, which was not intentional during this period of several weeks.  The patient also complained of abdominal pain.  The pain is located at the epigastric region with radiation to the right flank associated with nausea, but no vomiting.  The patient described the pain as dull ache, 2/10 in severity, constant with no known aggravating or relieving factors.  No associated fever, rigors or chills.  The patient was finally convinced by his sister to go to the emergency room for evaluation. [facility] was taken to [facility] at [facility] where a CT scan of the abdomen and pelvis with contrast revealed possible esophageal mass concerning for malignancy.  The patient was then referred to the hospitalist service for evaluation for admission. DISCHARGE DIAGNOSES / PLAN:      Advanced and likely metastatic esophageal carcinoma, new diagnosis  S/p EGD with biopsy 1/17: small 1.5 cm nodule of proximal esophagus, biopsied to evaluate for neoplasia.  From 30cm from incisors to GEJ (40cm from incisors), there is extensive, circumferential friable, granular, and nodular mucosa with a large lateral ulceration possibly fistulizing into third space or pleura. Area biopsied extensively  Await pathology evaluation of biopsy / resected tissue  Gastrograffin esophagram is negative for tumor fistula  Liquid diet  Per surg onc: Currently no Thoracic Surgery at [facility], Recommend patient be transfered to [facility] for further work-up & care of esophageal mass  Per heme/onc: Lives in [place] and can assist in getting him set up with [facility]  Will need OP PET to determine extent of disease  Appears to be advanced disease and treatments are likely to be palliative   CXR initially suggested a pulm nodule but this was ruled out on CT chest     ADDITIONAL CARE RECOMMENDATIONS:  F/u ASAP [facility], GI, surg onc at [facility]    PENDING TEST RESULTS:   At the time of discharge the following test results are still pending: GI pathology    Patient Instructions     FOLLOW UP APPOINTMENTS:    Follow-up Information     Follow up With Specialties Details Why Contact Info    [facility]  Schedule an appointment as soon as possible for a visit  [address] [facility] [address] [address] [address] [facility]  Schedule an appointment as soon as possible for a visit  [address] [place] [address]  [phone]    Valdemar Chen MD Gastroenterology Schedule an appointment as soon as possible for a visit  [address] [facility]  [address] [facility]  [phone]      PALLIATIVE MEDICINE  Schedule an appointment as soon as possible for a visit  [address], [address] [facility] [address] [address] [address]    Grant Owusu MD Hematology and Oncology, Internal Medicine, Hematology, Oncology Schedule an appointment as soon as possible for a visit  [address] [address] [address]  [phone]      None    None (395) Patient stated that they have no PCP           DIET: Liquid diet    ACTIVITY: Activity as tolerated    NOTIFY YOUR PHYSICIAN FOR ANY OF THE FOLLOWING:   Fever over 101 degrees for 24 hours. Chest pain, shortness of breath, fever, chills, nausea, vomiting, diarrhea, change in mentation, falling, weakness, bleeding. Severe pain or pain not relieved by medications. Or, any other signs or symptoms that you may have questions about. DISPOSITION:  x  Home With:   OT  PT  HH  RN       Long term SNF/Inpatient Rehab    Independent/assisted living    Hospice    Other:       PATIENT CONDITION AT DISCHARGE:   Functional status    Poor     Deconditioned    x Independent      Cognition    x Lucid     Forgetful     Dementia      Catheters/lines (plus indication)    Hess     PICC     PEG    x None      Code status   x  Full code     DNR      PHYSICAL EXAMINATION AT DISCHARGE:  General:  Alert, cooperative, no distress  Back:    Symmetric, ROM normal. No CVA tenderness. Lungs:   Clear to auscultation bilaterally, symmetric expansion, no respiratory distress  Chest wall:  No tenderness or deformity  Heart:   Regular rate and rhythm, no murmur  Abdomen:   Soft, non-tender  Extremities: Extremities normal, atraumatic, no cyanosis or edema  Skin:  Skin color, texture, turgor normal. No rashes or lesions  Neurologic: CNII-XII intact. FINN. A&O    CHRONIC MEDICAL DIAGNOSES:  Problem List as of 1/17/2022 Date Reviewed: 1/15/2022          Codes Class Noted - Resolved    * (Principal) Esophageal mass ICD-10-CM: K22.89  ICD-9-CM: 530.89  1/15/2022 - Present              DISCHARGE MEDICATIONS:  Current Discharge Medication List      CONTINUE these medications which have NOT CHANGED    Details   !! OTHER Tumeric 2 capsules PO DAILY      !! OTHER Vitamin D & K 1 tablet PO DAILY      ascorbic acid, vitamin C, (Vitamin C) 500 mg tablet Take 500 mg by mouth daily.       !! OTHER Ultimate Immunity 1 capsule PO DAILY       ! ! - Potential duplicate medications found. Please discuss with provider.         Greater than 30 minutes were spent with the patient on counseling and coordination of care    Signed:   Candido Aguilar MD  1/17/2022  5:32 PM

## 2022-01-17 NOTE — PROGRESS NOTES
Neetaserenity Shoemaker  1965  [id number]    Situation:  Verbal report received from: lindsay Rodriguez RN  Procedure: Procedure(s):  ESOPHAGOGASTRODUODENOSCOPY (EGD)  ESOPHAGOGASTRODUODENAL (EGD) BIOPSY    Background:    Preoperative diagnosis: Esophageal Mass  Postoperative diagnosis: Esophageal Masses    :  Dr. Ramirez Gentle  Assistant(s): Endoscopy Technician-1: Carie Vo  Endoscopy RN-1: Mabel Rosen RN    Specimens:   ID Type Source Tests Collected by Time Destination   1 : Distal Esophagus Mass Bx Preservative Esophagus, Distal  Esther Sidhu MD 1/17/2022 1208 Pathology   2 : Proximal Esophagus Mass Bx Preservative Esophagus, Proximal  Esther Sidhu MD 1/17/2022 1209 Pathology     H. Pylori  no    Anesthesia gave intra-procedure sedation and medications, see anesthesia flow sheet yes    Intravenous fluids: NS@ KVO     Vital signs stable     Abdominal assessment: round and soft     Recommendation:  Return to floor

## 2022-01-17 NOTE — PROGRESS NOTES
[facility] [place] [facility]    Reason for Visit:   Armaan Pete is a 55 y.o. male who is seen in follow up esophageal mass    Treatment History:   · n/a    History of Present Illness:   Pt admitted on 1/15/22. C/o 10-15 lb weight loss, not intentional, over several weeks. Complains of abd pain, epigastric, radiated to right flank, associated with nausea. 2/10, dull. CT showed possible esophageal mass    Interval History: EGD today with Dr. Mikayla Kwko and findings as below. He is fatigued, abdominal pain slightly improved today. Esophagram with no fistula, mass again seen. Clear liquids. He lives in [address] [place] [address]. Esophagus: small 1.5 cm nodule of proximal esophagus, biopsied to evaluate for neoplasia. From 30cm from incisors to GEJ (40cm from incisors), there is extensive, circumferential friable, granular, and nodular mucosa with a large lateral ulceration possibly fistulizing into third space or pleura. Area biopsied extensively to evaluate for neoplasia and malignancy. History reviewed. No pertinent past medical history. History reviewed. No pertinent surgical history. Social History     Tobacco Use    Smoking status: Former Smoker    Smokeless tobacco: Never Used   Substance Use Topics    Alcohol use: Not Currently     Comment: Rarely      History reviewed. No pertinent family history.   Current Facility-Administered Medications   Medication Dose Route Frequency    calcium carbonate (TUMS) chewable tablet 200 mg [elemental]  200 mg Oral TID PRN    sodium chloride (NS) flush 5-40 mL  5-40 mL IntraVENous Q8H    sodium chloride (NS) flush 5-40 mL  5-40 mL IntraVENous PRN    acetaminophen (TYLENOL) tablet 650 mg  650 mg Oral Q6H PRN    Or    acetaminophen (TYLENOL) suppository 650 mg  650 mg Rectal Q6H PRN    polyethylene glycol (MIRALAX) packet 17 g  17 g Oral DAILY PRN    ondansetron (ZOFRAN ODT) tablet 4 mg  4 mg Oral Q8H PRN    Or    ondansetron (ZOFRAN) injection 4 mg  4 mg IntraVENous Q6H PRN    morphine injection 2 mg  2 mg IntraVENous Q4H PRN    lactated Ringers infusion  75 mL/hr IntraVENous CONTINUOUS    pantoprazole (PROTONIX) injection 40 mg  40 mg IntraVENous DAILY    And    sodium chloride (NS) flush 10 mL  10 mL IntraVENous DAILY      No Known Allergies     Review of Systems: A complete review of systems was obtained, negative except as described above. Physical Exam:     Visit Vitals  /74   Pulse 82   Temp 98.9 °F (37.2 °C)   Resp 14   Ht 5' 11\" (1.803 m)   Wt 166 lb 10.7 oz (75.6 kg)   SpO2 96%   BMI 23.25 kg/m²     ECOG PS: 1    Constitutional: Appears well-developed and well-nourished in no apparent distress      Mental status: Alert and awake, Oriented to person/place/time, Able to follow commands    Eyes: EOM normal, Sclera normal, No visible ocular discharge    HENT: Normocephalic, atraumatic    Mouth/Throat: Moist mucous membranes    External Ears normal    Neck: left SC palpable    Pulmonary/Chest: Respiratory effort normal, No visualized signs of difficulty breathing or respiratory distress    Musculoskeletal: Normal range of motion of neck    Neurological: No facial asymmetry (Cranial nerve 7 motor function), No gaze palsy    Skin: No significant exanthematous lesions or discoloration noted on facial skin    Psychiatric: Normal affect, No hallucinations             Results:     Lab Results   Component Value Date/Time    WBC 8.4 01/16/2022 12:07 AM    HGB 12.7 01/16/2022 12:07 AM    HCT 38.9 01/16/2022 12:07 AM    PLATELET 634 25/35/6760 12:07 AM    MCV 87.4 01/16/2022 12:07 AM    ABS.  NEUTROPHILS 6.4 01/16/2022 12:07 AM     Lab Results   Component Value Date/Time    Sodium 137 01/16/2022 12:07 AM    Potassium 3.8 01/16/2022 12:07 AM    Chloride 107 01/16/2022 12:07 AM    CO2 25 01/16/2022 12:07 AM    Glucose 91 01/16/2022 12:07 AM    BUN 8 01/16/2022 12:07 AM    Creatinine 0.62 (L) 01/16/2022 12:07 AM    GFR est AA >60 01/16/2022 12:07 AM    GFR est non-AA >60 01/16/2022 12:07 AM    Calcium 8.5 01/16/2022 12:07 AM     Lab Results   Component Value Date/Time    Bilirubin, total 0.4 01/16/2022 12:07 AM    ALT (SGPT) 12 01/16/2022 12:07 AM    Alk. phosphatase 85 01/16/2022 12:07 AM    Protein, total 5.8 (L) 01/16/2022 12:07 AM    Albumin 2.5 (L) 01/16/2022 12:07 AM    Globulin 3.3 01/16/2022 12:07 AM     1/15/22 CT a/p    FINDINGS:   LOWER THORAX: There is a trace left pleural effusion. Diffuse lower esophageal  heterogeneously enhancing wall thickening with lower mediastinal adjacent  necrotic lymphadenopathy. Representative examples include a 2.0 cm and 1.4 cm  low posterior mediastinal nodes (3:7). LIVER: Mild periportal edema. No masses. BILIARY TREE: Gallbladder is within normal limits. CBD is not dilated. SPLEEN: Unremarkable. PANCREAS: No mass or ductal dilatation. ADRENALS: Unremarkable. KIDNEYS: No mass, calculus, or hydronephrosis. Enhance symmetrically. Subcentimeter cystic foci are too small to characterize, though likely  representing benign cysts. STOMACH: Unremarkable. SMALL BOWEL: No dilatation or wall thickening. COLON: Severe diverticulosis. No dilatation or wall thickening. APPENDIX: Unremarkable. PERITONEUM: Diffuse mild mesenteric edema. Mild pelvic simple ascites. Diffuse  bulky retroperitoneal, peripancreatic, perigastric, and matthew hepatis  lymphadenopathy. Representative examples include a 2.5 cm x 3.8 cm matthew hepatis  node (3:26), 2.9 x 3.0 cm portacaval node (3:26), 2.8 x 2.2 cm peripancreatic  node (3:32), and 2.5 x 2.4 cm right periaortic node (3:33). RETROPERITONEUM: No aortic aneurysm. Nodes as above. REPRODUCTIVE ORGANS: The prostate and seminal vesicles are unremarkable. URINARY BLADDER: No mass or calculus. BONES: No destructive bone lesion. ABDOMINAL WALL: No mass or hernia. ADDITIONAL COMMENTS: N/A     IMPRESSION  1.   Heterogeneously enhancing concentric lower esophageal wall thickening most  concerning for primary neoplastic process. Further evaluation with endoscopy is  recommended. 2.  Diffuse mediastinal, upper abdominal, and retroperitoneal adenopathy  concerning for metastatic disease. Lymphoma is an additional consideration.       Records reviewed and summarized above. Radiology report(s) reviewed above. Assessment/plan:   1. Esophageal mass:    Distal esophageal mass with mediastinal, abdominal, and retroperitoneal LN concerning for metastatic disease  EGD with multiple biopsies obtained 1/17/22  No fistula of barium  Lives in [place] and can assist in getting him set up with [facility]  Will need OP PET to determine extent of disease  Path pending    I did review that this appears to be advanced disease and treatments are likely to be palliative     2. Dysphagia  Secondary to mass; tolerating clear liquids    3. Pain, abdominal  PRN tylenol, morphine     4. Unintentional weight loss    I appreciate the opportunity to participate in Mr. Fermin Tinoco care. I personally saw and evaluated the patient and performed the key components of medical decision making. The history, physical exam, and documentation were performed by Catarina Phelps NP. I reviewed and verified the above documentation and modified it as needed. Advanced and likely metastatic esophageal carcinoma  Path pending, scans reviewed personally and discussed  Please help set up with [facility] asap prior to d/c    Signed By: Saskia Velasquez MD      No orders of the defined types were placed in this encounter.

## 2022-01-17 NOTE — PROGRESS NOTES
[address] visit attempted. [facility] He was out of his room for a procedure. Prayer for spiritual communion offered for his well-being.     ASHLEY Kenyon, RN, ACSW, LCSW   Page:  [phone]

## 2022-01-17 NOTE — CONSULTS
Surgery Consultation    Admit Date: 1/15/2022  Reason for Consultation: Esophageal mass    HPI:  Bill Farfan is a 55 y.o. male  without significant PMH who we are asked to see in Surgical evaluation by Dr. Lobito Fitzpatrick for a newly-diagnosed esophageal mass. He presented to ED 2 days ago c/o GERD, associated with 10-15# unintentional weight loss, epigastric pain & RLQ pain when lying down. He also reports decreased appetite recently, occasional constipation and difficulty eating solid foods (ie sandwiches)    Chest CT (1/16/2022): IMPRESSION  1. No pulmonary nodule demonstrated. 2. Lower third esophageal mural thickening, especially just proximal and at the  gastroesophageal junction. 3. Paraesophageal, subcarinal, prevascular mediastinal, supraclavicular  abdominal lymphadenopathy. 4. Small left pleural effusion slightly increased in the interval, with  posterior left basilar subsegmental atelectasis in the interval.    EGD performed this AM revelaed:  small 1.5 cm nodule of proximal esophagus, biopsied to evaluate for neoplasia. From 30cm from incisors to GEJ (40cm from incisors), there is extensive, circumferential friable, granular, and nodular mucosa with a large lateral ulceration possibly fistulizing into third space or pleura. Area biopsied extensively to evaluate for neoplasia and malignancy. Barium swallow (1/17/2022): IMPRESSION  Distal esophageal mass lesion is demonstrated. No evidence of esophagopleural fistula. Patient Active Problem List    Diagnosis Date Noted    Esophageal mass 01/15/2022     History reviewed. No pertinent past medical history. History reviewed. No pertinent surgical history. Social History     Tobacco Use    Smoking status: Former Smoker    Smokeless tobacco: Never Used   Substance Use Topics    Alcohol use: Not Currently     Comment: Rarely      History reviewed. No pertinent family history.    Prior to Admission medications    Medication Sig Start Date End Date Taking? Authorizing Provider   OTHER Tumeric 2 capsules PO DAILY   Yes Other, MD Vijay   OTHER Vitamin D & K 1 tablet PO DAILY   Yes Other, MD Vijay   ascorbic acid, vitamin C, (Vitamin C) 500 mg tablet Take 500 mg by mouth daily. Yes Other, MD Vijay   OTHER Ultimate Immunity 1 capsule PO DAILY   Yes Other, MD Vijay     No Known Allergies       Subjective:     Review of Systems:    A comprehensive review of systems was negative except for that written in the History of Present Illness. Objective:     Blood pressure 113/74, pulse 82, temperature 98.9 °F (37.2 °C), resp. rate 14, height 5' 11\" (1.803 m), weight 166 lb 10.7 oz (75.6 kg), SpO2 96 %. No results found for this or any previous visit (from the past 24 hour(s)). _____________________  Physical Exam:     General:  Alert, cooperative, no distress, appears stated age. Eyes:   Sclera clear. Throat: Lips, mucosa, and tongue normal.   Neck: Supple, symmetrical, trachea midline. Lungs:   CTA. On RA   Heart:  RRR. Abdomen:   Soft, NTND. +BS   Extremities: Extremities normal, atraumatic, no cyanosis or edema.    Skin: Skin w/d/i           Assessment:   Principal Problem:    Esophageal mass (1/15/2022)            Plan:     Most likely esophageal cancer with assoc lymphadenopathy  Currently no Thoracic Surgery at [facility] patient be transfered to [facility] for further work-up & care of esophageal mass      Thank you for including us in the care of your patient    Signed By: ANKIT Angeles     January 17, 2022

## 2022-01-17 NOTE — CONSULTS
[address]    Name:  Ximena Avila  MR#:  [id number]  :  1965  ACCOUNT #:  [de-identified]  DATE OF SERVICE:  2022    GASTROENTEROLOGY CONSULT    CHIEF COMPLAINT:  Weight loss for the past three weeks. REASON FOR CONSULTATION:  Dr. Yanick Boyle asked me to evaluate the patient for dysphagia and weight loss. HISTORY OF PRESENT ILLNESS:  The patient is a 43-year-old male, with no significant past medical history except GERD, who was in his usual state of health until about three weeks ago, when he started losing weight. He states that, for the past three weeks whenever he eats food, it seems to get stuck in the mid chest, around the xiphisternum. He has lost about 15 pounds. Dysphagia is more to solids than liquids. He has history of GERD for over 20 years, but he has never seen a doctor. The patient does not have a primary care physician currently, and he lives in [facility]. He is also complaining of some pain in the epigastrium, which radiates to the right flank, associated with nausea but no vomiting. The patient had been avoiding to see a doctor, but was convinced by his sister to go to the emergency room, and he went to the [facility] Emergency Room, where a CT scan of abdomen and pelvis was performed, which revealed an esophageal mass concerning for malignancy. He was then admitted to the hospital for further evaluation. PAST MEDICAL HISTORY:  Significant for GERD. SOCIAL HISTORY:  He is a nonsmoker and social drinker. REVIEW OF SYSTEMS:  CONSTITUTIONAL:  Positive for weight loss. RESPIRATORY:  No cough, expectoration, or hemoptysis. CARDIOVASCULAR:  No chest pain, syncope, palpitations. GASTROINTESTINAL:  Positive for dysphagia and abdominal pain. CENTRAL NERVOUS SYSTEM:  No history of seizures or loss of consciousness.     Review of lymphatic, hematologic, musculoskeletal, genitourinary, endocrine, and metabolic systems revealed no abnormalities. All other systems were negative. PHYSICAL EXAMINATION:  GENERAL:  He is alert, somewhat anxious. VITAL SIGNS:  Temperature of 98.2, pulse of 86, respiratory rate of 18, blood pressure 121/67. HEAD, EYES, and ENT:  Pupils are equal, reactive to light and accommodation. Eye movements are normal.  NECK:  Supple. There is no carotid bruit or jugular venous distention. CHEST:  Clear to auscultation. No crackles or wheeze. ABDOMEN:  Soft, nontender. Normal active bowel sounds. No palpable masses. CENTRAL NERVOUS SYSTEM:  He is alert and oriented x3. There is no gross sensory or motor neurological deficit. EXTREMITIES:  Negative for cyanosis, clubbing, or edema. LABORATORY DATA:  White count is 8.4, hemoglobin is 12.7, platelets are 261. Creatinine is 0.62. Liver enzymes are normal.  CT scan of the abdomen and pelvis with contrast reveals concentric lower esophageal wall thickening consistent for malignancy, and diffuse mediastinal, upper abdominal, and retroperitoneal lymphadenopathy, consistent for metastatic disease. ASSESSMENT:  A 71-year-old male who has presented with a longstanding history of gastroesophageal reflux disease with dysphagia and weight loss. CT scan was suggestive of malignancy just above the gastroesophageal junction, with possible metastatic disease in the lymph nodes. We will proceed with an endoscopy with possible biopsies by Dr. Angel Epley tomorrow. We will keep the patient NPO after midnight. If the endoscopy confirms the diagnosis of esophageal malignancy, he will need an Oncology consult and possibly endoscopic ultrasound. RECOMMENDATIONS:  1. NPO after 5 p.m. 2.  Endoscopy with possible biopsies tomorrow by Dr. Angel Epley. 3.  Further recommendations to follow after endoscopy results are available. Thank you for consultation of the patient.       Hanh Pinedo MD      [id number]  D:  01/16/2022 18:29  T:  01/16/2022 23:37  JOB #: [id number]  CC:   Catrachita Ayala MD

## 2022-01-17 NOTE — PROGRESS NOTES
1130 TRANSFER - IN REPORT:    Verbal report received from [facility] RN(name) on [facility]  being received from [phone] (unit) for ordered procedure      Report consisted of patients Situation, Background, Assessment and   Recommendations(SBAR). Information from the following report(s) SBAR, Kardex, ED Summary, Intake/Output and Recent Results was reviewed with the receiving nurse. Opportunity for questions and clarification was provided. Assessment completed upon patients arrival to unit and care assumed. 1225 TRANSFER - OUT REPORT:    Verbal report given to [facility] [facility] RN(name) on [facility]  being transferred to 601 (unit) for routine post - op       Report consisted of patients Situation, Background, Assessment and   Recommendations(SBAR). Information from the following report(s) SBAR and Procedure Summary was reviewed with the receiving nurse. Lines:   Peripheral IV 01/15/22 Right Antecubital (Active)   Site Assessment Clean, dry, & intact 01/17/22 0920   Hub Color/Line Status Pink;Flushed;Patent;Capped 01/17/22 0920   Action Taken Open ports on tubing capped 01/17/22 0920   Alcohol Cap Used Yes 01/17/22 0920       Peripheral IV 01/17/22 Left Arm (Active)        Opportunity for questions and clarification was provided.       Patient transported with:   Shijiebang

## 2022-01-17 NOTE — PROGRESS NOTES
SUGAR:    RUR 5%    Disposition: Anticipate home with with sister or mother at discharge. Patient lives in [facility] [place], but will stay in [facility] for a few weeks following discharge. Transportation: Mother or sister    Follow up: Patient does not have a PCP, but plans to get one when his job starts in March.     Primary contact: Yolanda Vargas(Sister) [phone]    Carmelina Logan RN/CRM  [phone]

## 2022-01-18 DIAGNOSIS — K22.89 ESOPHAGEAL MASS: Primary | ICD-10-CM

## 2022-01-18 NOTE — PROGRESS NOTES
I have reviewed discharge instructions with the patient. Questions and concerns addressed. The patient verbalized understanding. Information given to patient on how to contact [facility] to set up outpatient appointment. IV accesses removed. 2000: Patient walked down for discharge via private vehicle.

## 2022-01-18 NOTE — PROGRESS NOTES
Problem: General Medical Care Plan  Goal: *Vital signs within specified parameters  Outcome: Resolved/Met  Goal: *Labs within defined limits  Outcome: Resolved/Met  Goal: *Absence of infection signs and symptoms  Outcome: Resolved/Met  Goal: *Optimal pain control at patient's stated goal  Outcome: Resolved/Met  Goal: *Skin integrity maintained  Outcome: Resolved/Met  Goal: *Fluid volume balance  Outcome: Resolved/Met  Goal: *Optimize nutritional status  Outcome: Resolved/Met  Goal: *Anxiety reduced or absent  Outcome: Resolved/Met  Goal: *Progressive mobility and function (eg: ADL's)  Outcome: Resolved/Met     Problem: Patient Education: Go to Patient Education Activity  Goal: Patient/Family Education  Outcome: Resolved/Met     Problem: Discharge Planning  Goal: *Discharge to safe environment  Outcome: Resolved/Met

## 2022-01-19 NOTE — ANESTHESIA POSTPROCEDURE EVALUATION
Post-Anesthesia Evaluation and Assessment    Patient: Marina Ontiveros MRN: [id number]  SSN: [id number]    YOB: 1965  Age: 55 y.o. Sex: male      I have evaluated the patient and they are stable and ready for discharge from the PACU. Cardiovascular Function/Vital Signs  Visit Vitals  /74 (BP 1 Location: Right arm, BP Patient Position: At rest)   Pulse 88   Temp 36.3 °C (97.4 °F)   Resp 16   Ht 5' 11\" (1.803 m)   Wt 75.6 kg (166 lb 10.7 oz)   SpO2 98%   BMI 23.25 kg/m²       Patient is status post MAC anesthesia for Procedure(s):  ESOPHAGOGASTRODUODENOSCOPY (EGD)  ESOPHAGOGASTRODUODENAL (EGD) BIOPSY. Nausea/Vomiting: None    Postoperative hydration reviewed and adequate. Pain:  Pain Scale 1: Numeric (0 - 10) (01/17/22 1554)  Pain Intensity 1: 0 (01/17/22 1554)   Managed    Neurological Status: At baseline    Mental Status, Level of Consciousness: Alert and  oriented to person, place, and time    Pulmonary Status:   O2 Device: None (Room air) (01/17/22 1230)   Adequate oxygenation and airway patent    Complications related to anesthesia: None    Post-anesthesia assessment completed. No concerns    Signed By: Roxanna Burton MD     January 19, 2022              Procedure(s):  ESOPHAGOGASTRODUODENOSCOPY (EGD)  ESOPHAGOGASTRODUODENAL (EGD) BIOPSY.     MAC    <BSHSIANPOST>    INITIAL Post-op Vital signs:   Vitals Value Taken Time   /74 01/17/22 1458   Temp 36.3 °C (97.4 °F) 01/17/22 1458   Pulse 88 01/17/22 1458   Resp 16 01/17/22 1458   SpO2 98 % 01/17/22 1458

## 2022-01-20 ENCOUNTER — DOCUMENTATION ONLY (OUTPATIENT)
Dept: ONCOLOGY | Age: 57
End: 2022-01-20

## 2022-01-20 NOTE — PROGRESS NOTES
Patient discharged from [facility]. 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 Urgent referral placed to [facility] where patient resides. Referral faxed by our office 1/18/22.     Avinash Ortiz NP

## 2022-03-20 PROBLEM — K22.89 ESOPHAGEAL MASS: Status: ACTIVE | Noted: 2022-01-15

## 2023-06-23 NOTE — ANESTHESIA PREPROCEDURE EVALUATION
Relevant Problems   No relevant active problems       Anesthetic History   No history of anesthetic complications            Review of Systems / Medical History  Patient summary reviewed, nursing notes reviewed and pertinent labs reviewed    Pulmonary  Within defined limits                 Neuro/Psych   Within defined limits           Cardiovascular                  Exercise tolerance: >4 METS     GI/Hepatic/Renal                Endo/Other             Other Findings              Physical Exam    Airway  Mallampati: II  TM Distance: > 6 cm  Neck ROM: normal range of motion   Mouth opening: Normal     Cardiovascular    Rhythm: regular           Dental  No notable dental hx       Pulmonary  Breath sounds clear to auscultation               Abdominal         Other Findings            Anesthetic Plan    ASA: 2  Anesthesia type: MAC          Induction: Intravenous  Anesthetic plan and risks discussed with: Patient
Yes

## (undated) DEVICE — FORCEPS BX L240CM JAW DIA2.8MM L CAP W/ NDL MIC MESH TOOTH

## (undated) DEVICE — TUBING HYDR IRR --